# Patient Record
Sex: FEMALE | Race: WHITE
[De-identification: names, ages, dates, MRNs, and addresses within clinical notes are randomized per-mention and may not be internally consistent; named-entity substitution may affect disease eponyms.]

---

## 2017-04-27 ENCOUNTER — HOSPITAL ENCOUNTER (OUTPATIENT)
Dept: HOSPITAL 63 - US | Age: 22
Discharge: HOME | End: 2017-04-27
Attending: FAMILY MEDICINE
Payer: SELF-PAY

## 2017-04-27 DIAGNOSIS — K85.90: Primary | ICD-10-CM

## 2017-04-27 PROCEDURE — 76700 US EXAM ABDOM COMPLETE: CPT

## 2017-04-27 NOTE — RAD
Complete abdominal ultrasound 



History: Right upper quadrant pain for 1.5 weeks. Elevated lipase.

Pancreatitis.



Comparison: None.



Procedure: Transabdominal ultrasound images are obtained.



Findings:



Pancreas is not well visualized or evaluated.



Liver is normal in echogenicity.  No focal hepatic masses are identified. 

Right hepatic lobe measures 12.1 cm in length.



Gallbladder has an unremarkable appearance.  Common bile duct measures

normally at 3 mm in diameter.



Spleen is unremarkable.  Splenic length is 8.6 cm. 



Right kidney is normal in size and configuration without hydronephrosis.  Left

kidney is normal in size and configuration without hydronephrosis.



Visualized portions of the aorta and IVC have normal caliber.     



Impression: 

1.  Pancreas is not well visualized or evaluated.

2.  Unremarkable abdominal ultrasound.

## 2017-04-29 VITALS
DIASTOLIC BLOOD PRESSURE: 72 MMHG | SYSTOLIC BLOOD PRESSURE: 134 MMHG | DIASTOLIC BLOOD PRESSURE: 72 MMHG | SYSTOLIC BLOOD PRESSURE: 134 MMHG

## 2017-06-14 ENCOUNTER — HOSPITAL ENCOUNTER (OUTPATIENT)
Dept: HOSPITAL 63 - NM | Age: 22
Discharge: HOME | End: 2017-06-14
Attending: FAMILY MEDICINE
Payer: SELF-PAY

## 2017-06-14 VITALS — HEIGHT: 63 IN | WEIGHT: 165 LBS | BODY MASS INDEX: 29.23 KG/M2

## 2017-06-14 DIAGNOSIS — R10.11: Primary | ICD-10-CM

## 2017-06-14 PROCEDURE — A9537 TC99M MEBROFENIN: HCPCS

## 2017-06-14 PROCEDURE — 96374 THER/PROPH/DIAG INJ IV PUSH: CPT

## 2017-06-14 PROCEDURE — 96375 TX/PRO/DX INJ NEW DRUG ADDON: CPT

## 2017-06-14 PROCEDURE — 78226 HEPATOBILIARY SYSTEM IMAGING: CPT

## 2017-06-14 NOTE — RAD
Radionuclide hepatobiliary scan with gallbladder ejection fraction, 6/14/2017:



History: Right upper quadrant pain



Following IV injection of 5.5 mCi of technetium 99m Choletec there was prompt

uptake of the radionuclide from the blood stream by the liver. Activity is

present in the gallbladder and bile ducts at 10 minutes and in the small bowel

at 50 minutes. Additional imaging of the gallbladder was performed following

IV injection of 1.5 mcg of cholecystokinin. The gallbladder ejection fraction

was calculated at 20%. 30-50% is considered to be the borderline low range.





IMPRESSION:

1. No evidence of cystic duct or common bile duct obstruction.

2. Low gallbladder ejection fraction of 20%.

## 2017-09-11 ENCOUNTER — HOSPITAL ENCOUNTER (OUTPATIENT)
Dept: HOSPITAL 63 - US | Age: 22
Discharge: HOME | End: 2017-09-11
Attending: FAMILY MEDICINE
Payer: COMMERCIAL

## 2017-09-11 DIAGNOSIS — E07.89: ICD-10-CM

## 2017-09-11 DIAGNOSIS — E04.2: Primary | ICD-10-CM

## 2017-09-11 PROCEDURE — 76536 US EXAM OF HEAD AND NECK: CPT

## 2017-09-11 NOTE — RAD
Indication nontoxic multinodular goiter.



Grayscale images were obtained. The examination was targeted to the thyroid.

No prior imaging of the thyroid is available.



The right lobe of the thyroid measures 4.5 x 1.4 x 1.6 cm and appears normal.

No mass is seen. The isthmus appears normal. There is a 3 mm mass in the left

lobe of the thyroid.



IMPRESSION: 3 mm mass in the left lobe of the thyroid otherwise unremarkable

study.

## 2021-05-18 ENCOUNTER — HOSPITAL ENCOUNTER (EMERGENCY)
Dept: HOSPITAL 63 - ER | Age: 26
Discharge: HOME | End: 2021-05-18
Payer: SELF-PAY

## 2021-05-18 VITALS — SYSTOLIC BLOOD PRESSURE: 117 MMHG | DIASTOLIC BLOOD PRESSURE: 77 MMHG

## 2021-05-18 VITALS — WEIGHT: 199.3 LBS | HEIGHT: 63 IN | BODY MASS INDEX: 35.31 KG/M2

## 2021-05-18 DIAGNOSIS — F32.9: ICD-10-CM

## 2021-05-18 DIAGNOSIS — Z90.49: ICD-10-CM

## 2021-05-18 DIAGNOSIS — F41.9: ICD-10-CM

## 2021-05-18 DIAGNOSIS — N13.2: Primary | ICD-10-CM

## 2021-05-18 LAB
% LYMPHS: 5 % (ref 24–48)
% MONOS: 1 % (ref 0–10)
% SEGS: 94 % (ref 35–66)
ALBUMIN SERPL-MCNC: 4 G/DL (ref 3.4–5)
ALBUMIN/GLOB SERPL: 1.1 {RATIO} (ref 1–1.7)
ALP SERPL-CCNC: 94 U/L (ref 46–116)
ALT SERPL-CCNC: 36 U/L (ref 14–59)
ANION GAP SERPL CALC-SCNC: 9 MMOL/L (ref 6–14)
APTT PPP: YELLOW S
AST SERPL-CCNC: 33 U/L (ref 15–37)
BACTERIA #/AREA URNS HPF: 0 /HPF
BASOPHILS # BLD AUTO: 0.1 X10^3/UL (ref 0–0.2)
BASOPHILS NFR BLD: 0 % (ref 0–3)
BILIRUB SERPL-MCNC: 0.7 MG/DL (ref 0.2–1)
BILIRUB UR QL STRIP: (no result)
BUN/CREAT SERPL: 13 (ref 6–20)
CA-I SERPL ISE-MCNC: 13 MG/DL (ref 7–20)
CALCIUM SERPL-MCNC: 8.5 MG/DL (ref 8.5–10.1)
CHLORIDE SERPL-SCNC: 104 MMOL/L (ref 98–107)
CO2 SERPL-SCNC: 26 MMOL/L (ref 21–32)
CREAT SERPL-MCNC: 1 MG/DL (ref 0.6–1)
EOSINOPHIL NFR BLD: 0 % (ref 0–3)
EOSINOPHIL NFR BLD: 0 X10^3/UL (ref 0–0.7)
ERYTHROCYTE [DISTWIDTH] IN BLOOD BY AUTOMATED COUNT: 13.1 % (ref 11.5–14.5)
FIBRINOGEN PPP-MCNC: (no result) MG/DL
GFR SERPLBLD BASED ON 1.73 SQ M-ARVRAT: 67 ML/MIN
GLOBULIN SER-MCNC: 3.7 G/DL (ref 2.2–3.8)
GLUCOSE SERPL-MCNC: 106 MG/DL (ref 70–99)
GLUCOSE UR STRIP-MCNC: (no result) MG/DL
HCT VFR BLD CALC: 41.6 % (ref 36–47)
HGB BLD-MCNC: 13.9 G/DL (ref 12–15.5)
LIPASE: 105 U/L (ref 73–393)
LYMPHOCYTES # BLD: 0.9 X10^3/UL (ref 1–4.8)
LYMPHOCYTES NFR BLD AUTO: 6 % (ref 24–48)
MAGNESIUM SERPL-MCNC: 1.9 MG/DL (ref 1.8–2.4)
MCH RBC QN AUTO: 31 PG (ref 25–35)
MCHC RBC AUTO-ENTMCNC: 34 G/DL (ref 31–37)
MCV RBC AUTO: 91 FL (ref 79–100)
MONO #: 0.8 X10^3/UL (ref 0–1.1)
MONOCYTES NFR BLD: 5 % (ref 0–9)
NEUT #: 14.4 X10^3UL (ref 1.8–7.7)
NEUTROPHILS NFR BLD AUTO: 89 % (ref 31–73)
NITRITE UR QL STRIP: (no result)
PLATELET # BLD AUTO: 311 X10^3/UL (ref 140–400)
PLATELET # BLD EST: ADEQUATE 10*3/UL
POTASSIUM SERPL-SCNC: 3.7 MMOL/L (ref 3.5–5.1)
PROT SERPL-MCNC: 7.7 G/DL (ref 6.4–8.2)
RBC # BLD AUTO: 4.56 X10^6/UL (ref 3.5–5.4)
RBC #/AREA URNS HPF: (no result) /HPF (ref 0–2)
SODIUM SERPL-SCNC: 139 MMOL/L (ref 136–145)
SP GR UR STRIP: 1.02
SQUAMOUS #/AREA URNS LPF: (no result) /LPF
UROBILINOGEN UR-MCNC: 0.2 MG/DL
WBC # BLD AUTO: 16.1 X10^3/UL (ref 4–11)
WBC #/AREA URNS HPF: 0 /HPF (ref 0–4)

## 2021-05-18 PROCEDURE — 74177 CT ABD & PELVIS W/CONTRAST: CPT

## 2021-05-18 PROCEDURE — 96361 HYDRATE IV INFUSION ADD-ON: CPT

## 2021-05-18 PROCEDURE — 36415 COLL VENOUS BLD VENIPUNCTURE: CPT

## 2021-05-18 PROCEDURE — 85025 COMPLETE CBC W/AUTO DIFF WBC: CPT

## 2021-05-18 PROCEDURE — 96375 TX/PRO/DX INJ NEW DRUG ADDON: CPT

## 2021-05-18 PROCEDURE — 85007 BL SMEAR W/DIFF WBC COUNT: CPT

## 2021-05-18 PROCEDURE — 96374 THER/PROPH/DIAG INJ IV PUSH: CPT

## 2021-05-18 PROCEDURE — 83690 ASSAY OF LIPASE: CPT

## 2021-05-18 PROCEDURE — 99285 EMERGENCY DEPT VISIT HI MDM: CPT

## 2021-05-18 PROCEDURE — 80053 COMPREHEN METABOLIC PANEL: CPT

## 2021-05-18 PROCEDURE — 81025 URINE PREGNANCY TEST: CPT

## 2021-05-18 PROCEDURE — 81001 URINALYSIS AUTO W/SCOPE: CPT

## 2021-05-18 PROCEDURE — 83735 ASSAY OF MAGNESIUM: CPT

## 2021-05-18 NOTE — PHYS DOC
Past History


Past Medical History:  Anxiety, Depression


Past Surgical History:  Cholecystectomy


Alcohol Use:  None





General Adult


EDM:


Chief Complaint:  ABDOMINAL PAIN





HPI:


HPI:





Patient is a [age] year old [sex] who presents with []





Review of Systems:


Review of Systems:


Constitutional: Denies fever or chills 


Eyes: Denies redness or eye pain 


HENT: Denies nasal congestion or sore throat


Respiratory: Denies cough or shortness of breath 


Cardiovascular: Denies chest pain or palpitations


GI: Denies abdominal pain, nausea, or vomiting


: Denies dysuria or hematuria


Musculoskeletal: Denies back pain or joint pain


Integument: Denies rash or skin lesions 


Neurologic: Denies headache, focal weakness or sensory changes





Complete systems were reviewed and found to be within normal limits, except as 

documented in this note.





Current Medications:


Current Meds:





Current Medications








 Medications


  (Trade)  Dose


 Ordered  Sig/Martínez  Start Time


 Stop Time Status Last Admin


Dose Admin


 


 Famotidine


  (Pepcid Vial)  20 mg  1X  ONCE  5/18/21 21:00


 5/18/21 21:04 DC 5/18/21 21:30


20 MG


 


 Info


  (Do NOT chart on


 this entry -- for


 MONITORING)  1 each  PRN DAILY  PRN  5/18/21 21:30


 5/20/21 21:29   





 


 Iohexol


  (Omnipaque 300


 Mg/ml)  75 ml  1X  ONCE  5/18/21 21:00


 5/18/21 21:22 DC 5/18/21 21:34


75 ML


 


 Ondansetron HCl


  (Zofran)  4 mg  1X  ONCE  5/18/21 21:00


 5/18/21 21:04 DC 5/18/21 21:30


4 MG


 


 Sodium Chloride  1,000 ml @ 


 1,000 mls/hr  1X  ONCE  5/18/21 21:00


 5/18/21 21:59 DC 5/18/21 21:30


1,000 MLS/HR


 


 Tamsulosin HCl


  (Flomax)  0.4 mg  1X  ONCE  5/18/21 22:30


 5/18/21 22:31   














Allergies:


Allergies:





Allergies








Coded Allergies Type Severity Reaction Last Updated Verified


 


  No Known Drug Allergies    4/28/17 No











Physical Exam:


PE:





Constitutional: Well developed, well nourished, no acute distress, non-toxic 

appearance


HENT: Normocephalic, atraumatic


Eyes: PERRL, EOMI, conjunctiva normal, no discharge


Neck: Normal range of motion, no tenderness, supple


Lungs & Thorax:  No respiratory distress, equal chest rise and fall


Abdomen: Soft, no tenderness


Skin: Warm, dry, no erythema, no rash


Back: No tenderness, no CVA tenderness


Extremities: No tenderness, ROM intact, no edema


Neurologic: Alert and oriented X 3, normal motor function, normal sensory 

function, no focal deficits noted


Psychologic: Affect normal, judgment normal





Current Patient Data:


Labs:





                                Laboratory Tests








Test


 5/18/21


21:10 5/18/21


21:20 5/18/21


21:25


 


Urine Collection Type Unknown    


 


Urine Color Yellow    


 


Urine Clarity Hazy    


 


Urine pH 7.0    


 


Urine Specific Gravity 1.020    


 


Urine Protein


 Neg


(NEG-TRACE) 


 





 


Urine Glucose (UA)


 Neg mg/dL


(NEG) 


 





 


Urine Ketones (Stick)


 40 mg/dL (NEG)


 


 





 


Urine Blood Mod (NEG)    


 


Urine Nitrite Neg (NEG)    


 


Urine Bilirubin Neg (NEG)    


 


Urine Urobilinogen Dipstick


 0.2 mg/dL (0.2


mg/dL) 


 





 


Urine Leukocyte Esterase Neg (NEG)    


 


Urine RBC


 20-40 /HPF


(0-2) 


 





 


Urine WBC 0 /HPF (0-4)    


 


Urine Squamous Epithelial


Cells Few /LPF  


 


 





 


Urine Bacteria


 0 /HPF (0-FEW)


 


 





 


White Blood Count


 


 16.1 x10^3/uL


(4.0-11.0)  H 





 


Red Blood Count


 


 4.56 x10^6/uL


(3.50-5.40) 





 


Hemoglobin


 


 13.9 g/dL


(12.0-15.5) 





 


Hematocrit


 


 41.6 %


(36.0-47.0) 





 


Mean Corpuscular Volume


 


 91 fL ()


 





 


Mean Corpuscular Hemoglobin  31 pg (25-35)   


 


Mean Corpuscular Hemoglobin


Concent 


 34 g/dL


(31-37) 





 


Red Cell Distribution Width


 


 13.1 %


(11.5-14.5) 





 


Platelet Count


 


 311 x10^3/uL


(140-400) 





 


Neutrophils (%) (Auto)  89 % (31-73)  H 


 


Lymphocytes (%) (Auto)  6 % (24-48)  L 


 


Monocytes (%) (Auto)  5 % (0-9)   


 


Eosinophils (%) (Auto)  0 % (0-3)   


 


Basophils (%) (Auto)  0 % (0-3)   


 


Neutrophils # (Auto)


 


 14.4 x10^3uL


(1.8-7.7)  H 





 


Lymphocytes # (Auto)


 


 0.9 x10^3/uL


(1.0-4.8)  L 





 


Monocytes # (Auto)


 


 0.8 x10^3/uL


(0.0-1.1) 





 


Eosinophils # (Auto)


 


 0.0 x10^3/uL


(0.0-0.7) 





 


Basophils # (Auto)


 


 0.1 x10^3/uL


(0.0-0.2) 





 


Segmented Neutrophils %  94 % (35-66)  H 


 


Lymphocytes %  5 % (24-48)  L 


 


Monocytes %  1 % (0-10)   


 


Platelet Estimate


 


 Adequate


(ADEQUATE) 





 


Sodium Level


 


 139 mmol/L


(136-145) 





 


Potassium Level


 


 3.7 mmol/L


(3.5-5.1) 





 


Chloride Level


 


 104 mmol/L


() 





 


Carbon Dioxide Level


 


 26 mmol/L


(21-32) 





 


Anion Gap  9 (6-14)   


 


Blood Urea Nitrogen


 


 13 mg/dL


(7-20) 





 


Creatinine


 


 1.0 mg/dL


(0.6-1.0) 





 


Estimated GFR


(Cockcroft-Gault) 


 67.0  


 





 


BUN/Creatinine Ratio  13 (6-20)   


 


Glucose Level


 


 106 mg/dL


(70-99)  H 





 


Calcium Level


 


 8.5 mg/dL


(8.5-10.1) 





 


Magnesium Level


 


 1.9 mg/dL


(1.8-2.4) 





 


Total Bilirubin


 


 0.7 mg/dL


(0.2-1.0) 





 


Aspartate Amino Transferase


(AST) 


 33 U/L (15-37)


 





 


Alanine Aminotransferase (ALT)


 


 36 U/L (14-59)


 





 


Alkaline Phosphatase


 


 94 U/L


() 





 


Total Protein


 


 7.7 g/dL


(6.4-8.2) 





 


Albumin


 


 4.0 g/dL


(3.4-5.0) 





 


Albumin/Globulin Ratio  1.1 (1.0-1.7)   


 


Lipase


 


 105 U/L


() 





 


POC Urine HCG, Qualitative


 


 


 hcg negative


(Negative)








Vital Signs:





                                   Vital Signs








  Date Time  Temp Pulse Resp B/P (MAP) Pulse Ox O2 Delivery O2 Flow Rate FiO2


 


5/18/21 20:30 98.6 95 18 157/92 (113) 97   











EKG:


EKG:


[]





Radiology/Procedures:


Radiology/Procedures:


PROCEDURE: CT ABD PELV W/ IV CONTRST ONLY





EXAMINATION: CT ABDOMEN+PELVIS W (CT ABDOMEN/PELVIS WITH IV CONTRAST)





CLINICAL HISTORY: RLQ pain, N/V eval for acute appy 





TECHNIQUE: CT of the abdomen and pelvis was performed using standard technique, 

scanning from just above the dome of the diaphragm to the symphysis pubis 

following administration of intravenous contrast.  





CT Dose Reduction Employed: One or more of the following individualized dose 

reduction techniques were utilized for this examination:  1. Automated exposure 

control  2. Adjustment of the mA and/or kV according to patient size  3. Use of 

iterative reconstruction technique.





COMPARISON: 4/28/2017








FINDINGS:





Partially visualized heart and lung bases unremarkable.





Cholecystectomy. Liver, pancreas, spleen, and adrenal glands unremarkable.





4 mm calculus in the distal right ureter at the ureterovesical junction with 

mild right hydroureteronephrosis and mild right renal swelling and perinephric 

stranding. No additional urinary calculi visualized. Minimally filled urinary 

bladder suboptimally evaluated. Uterus and multifollicular ovaries unremarkable.





No bowel dilation or definite wall thickening. Partially visualized appendix 

unremarkable.





No evidence of abdominal aortic or iliac artery aneurysm.





No evidence of acute osseous abnormality.








IMPRESSION: 





4 mm calculus at the right ureterovesical junction with mild right 

hydroureteronephrosis.





Electronically signed by: Magdy Ma DO (5/18/2021 10:01 PM) [facility]





Heart Score:


C/O Chest Pain:  N/A





Course & Med Decision Making:


Course & Med Decision Making


Pertinent Labs and Imaging studies reviewed. (See chart for details)





Patient stable for discharge with outpatient follow-up with PCP. Discussed 

findings and plan with patient, who acknowledges understanding and agreement.





Dragon Disclaimer:


Dragon Disclaimer:


This electronic medical record was generated, in whole or in part, using a voice

 recognition dictation system.





Departure


Departure:


Impression:  


   Primary Impression:  


   Kidney stone on right side


Disposition:  01 HOME / SELF CARE / HOMELESS


Condition:  STABLE


Referrals:  


CAITY KWON MD (PCP)


Patient Instructions:  Diet for Kidney Stones, Kidney Stones, Easy-to-Read





Additional Instructions:  


Increase fluid hydration.





May also use over the counter Ibuprofen or Naproxen for pain or discomfort.


Scripts


Ondansetron (ONDANSETRON ODT) 4 Mg Tab.rapdis


1 TAB PO PRN Q6-8HRS PRN for NAUSEA, #16 TAB


   Prov: REJI ARCEO DO         5/18/21 


Hydrocodone Bit/Acetaminophen (HYDROCODONE-APAP 5-325  **) 1 Each Tablet


0.5-1 TAB PO PRN Q6HRS PRN for PAIN, #10 TAB 0 Refills


   Prov: REJI ARCEO DO         5/18/21 


Tamsulosin Hcl (FLOMAX) 0.4 Mg Cap.er.24h


1 CAP PO DAILY for Kidney stone, #7 CAP


   Prov: REJI ARCEO DO         5/18/21











REJI ARCEO DO             May 18, 2021 22:27

## 2021-05-18 NOTE — RAD
EXAMINATION: CT ABDOMEN+PELVIS W (CT ABDOMEN/PELVIS WITH IV CONTRAST)



CLINICAL HISTORY: RLQ pain, N/V eval for acute appy 



TECHNIQUE: CT of the abdomen and pelvis was performed using standard technique, scanning from just ab
ove the dome of the diaphragm to the symphysis pubis following administration of intravenous contrast
.  



CT Dose Reduction Employed: One or more of the following individualized dose reduction techniques wer
e utilized for this examination:  1. Automated exposure control  2. Adjustment of the mA and/or kV ac
cording to patient size  3. Use of iterative reconstruction technique.



COMPARISON: 4/28/2017





FINDINGS:



Partially visualized heart and lung bases unremarkable.



Cholecystectomy. Liver, pancreas, spleen, and adrenal glands unremarkable.



4 mm calculus in the distal right ureter at the ureterovesical junction with mild right hydroureteron
ephrosis and mild right renal swelling and perinephric stranding. No additional urinary calculi visua
lized. Minimally filled urinary bladder suboptimally evaluated. Uterus and multifollicular ovaries un
remarkable.



No bowel dilation or definite wall thickening. Partially visualized appendix unremarkable.



No evidence of abdominal aortic or iliac artery aneurysm.



No evidence of acute osseous abnormality.





IMPRESSION: 



4 mm calculus at the right ureterovesical junction with mild right hydroureteronephrosis.



Electronically signed by: Magdy Ma DO (5/18/2021 10:01 PM) [facility]

## 2021-11-17 ENCOUNTER — HOSPITAL ENCOUNTER (EMERGENCY)
Dept: HOSPITAL 63 - ER | Age: 26
Discharge: HOME | End: 2021-11-17
Payer: COMMERCIAL

## 2021-11-17 VITALS
DIASTOLIC BLOOD PRESSURE: 98 MMHG | SYSTOLIC BLOOD PRESSURE: 146 MMHG | BODY MASS INDEX: 36.37 KG/M2 | WEIGHT: 205.25 LBS | HEIGHT: 63 IN
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
## 2021-11-17 DIAGNOSIS — F41.9: ICD-10-CM

## 2021-11-17 DIAGNOSIS — R51.9: ICD-10-CM

## 2021-11-17 DIAGNOSIS — R11.0: Primary | ICD-10-CM
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
## 2021-11-17 DIAGNOSIS — F32.9: ICD-10-CM

## 2021-11-17 PROCEDURE — 99282 EMERGENCY DEPT VISIT SF MDM: CPT
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
